# Patient Record
Sex: FEMALE | Race: WHITE | NOT HISPANIC OR LATINO | Employment: OTHER | URBAN - METROPOLITAN AREA
[De-identification: names, ages, dates, MRNs, and addresses within clinical notes are randomized per-mention and may not be internally consistent; named-entity substitution may affect disease eponyms.]

---

## 2017-08-17 NOTE — PATIENT DISCUSSION
HEMORRHAGIC POSTERIOR VITREOUS DETACHMENT, OS:  NO HOLES. NO TEARS. RETINAL  DETACHMENT WARNINGS DISCUSSED. FLOATERS AND FLASHES BROCHURE GIVEN. REFER TO RETINA FOR FURTHER EVAL/RTC ASAP IF NOTING S/S OF RD.

## 2017-08-21 NOTE — PATIENT DISCUSSION
retinal hemorrhage a choroidal lesion involving the OS. NO TEARS. HEMORRHAGE NEXT TO OPTIC NERVE. COME BACK IN A MONTH TO CHECK IT WITH OCT.

## 2017-09-25 NOTE — PATIENT DISCUSSION
POSTERIOR VITREOUS DETACHMENT OS: NO HOLES, TEARS, OR RETINAL DETACHMENTS TODAY. ADVISED PT TO CALL IF ANY FLASHES OF LIGHT, INCREASE IN FLOATERS, OR DECREASE VISION IN EITHER EYE.

## 2018-04-04 NOTE — PATIENT DISCUSSION
POSTERIOR VITREOUS DETACHMENT, OS:  NO HOLES. NO TEARS. RETINAL  DETACHMENT WARNINGS DISCUSSED. RETURN FOR FOLLOW-UP AS SCHEDULED.

## 2018-07-12 ENCOUNTER — ESTABLISHED COMPREHENSIVE EXAM (OUTPATIENT)
Dept: URBAN - METROPOLITAN AREA CLINIC 40 | Facility: CLINIC | Age: 48
End: 2018-07-12

## 2018-07-12 DIAGNOSIS — Z83.518: ICD-10-CM

## 2018-07-12 DIAGNOSIS — H52.03: ICD-10-CM

## 2018-07-12 PROCEDURE — 92499OP2 OPTOMAP RETINAL SCREENING BOTH EYES

## 2018-07-12 PROCEDURE — 92015 DETERMINE REFRACTIVE STATE: CPT

## 2018-07-12 PROCEDURE — 92014 COMPRE OPH EXAM EST PT 1/>: CPT

## 2018-07-12 ASSESSMENT — KERATOMETRY
OS_K1POWER_DIOPTERS: 44.50
OD_K2POWER_DIOPTERS: 45.25
OS_AXISANGLE2_DEGREES: 061
OD_AXISANGLE_DEGREES: 030
OD_K1POWER_DIOPTERS: 44.00
OD_AXISANGLE2_DEGREES: 120
OS_AXISANGLE_DEGREES: 151
OS_K2POWER_DIOPTERS: 46.00

## 2018-07-12 ASSESSMENT — TONOMETRY
OD_IOP_MMHG: 15
OS_IOP_MMHG: 15

## 2018-07-12 ASSESSMENT — VISUAL ACUITY
OD_CC: J1
OS_CC: J1-
OU_CC: J1
OU_CC: 20/20-1
OS_CC: 20/20-2
OD_CC: 20/20

## 2019-07-23 ENCOUNTER — ESTABLISHED COMPREHENSIVE EXAM (OUTPATIENT)
Dept: URBAN - METROPOLITAN AREA CLINIC 40 | Facility: CLINIC | Age: 49
End: 2019-07-23

## 2019-07-23 DIAGNOSIS — H52.03: ICD-10-CM

## 2019-07-23 DIAGNOSIS — Z83.518: ICD-10-CM

## 2019-07-23 DIAGNOSIS — H00.14: ICD-10-CM

## 2019-07-23 PROCEDURE — 92499OP2 OPTOMAP RETINAL SCREENING BOTH EYES

## 2019-07-23 PROCEDURE — 92014 COMPRE OPH EXAM EST PT 1/>: CPT

## 2019-07-23 PROCEDURE — 92015 DETERMINE REFRACTIVE STATE: CPT

## 2019-07-23 ASSESSMENT — KERATOMETRY
OD_AXISANGLE2_DEGREES: 115
OS_K1POWER_DIOPTERS: 44.50
OD_K2POWER_DIOPTERS: 45.25
OS_K2POWER_DIOPTERS: 46
OD_AXISANGLE2_DEGREES: 120
OS_K1POWER_DIOPTERS: 44
OS_K2POWER_DIOPTERS: 46.00
OS_AXISANGLE2_DEGREES: 061
OS_AXISANGLE_DEGREES: 137
OD_AXISANGLE_DEGREES: 25
OD_K1POWER_DIOPTERS: 44.00
OS_AXISANGLE_DEGREES: 151
OD_AXISANGLE_DEGREES: 030
OD_K1POWER_DIOPTERS: 44
OD_K2POWER_DIOPTERS: 45.5
OS_AXISANGLE2_DEGREES: 47

## 2019-07-23 ASSESSMENT — VISUAL ACUITY
OD_CC: 20/20
OS_CC: 20/20-1
OU_CC: J1
OD_CC: J1
OU_CC: 20/20
OS_CC: J2

## 2019-07-23 ASSESSMENT — TONOMETRY
OS_IOP_MMHG: 14
OD_IOP_MMHG: 14

## 2020-07-28 ENCOUNTER — ESTABLISHED COMPREHENSIVE EXAM (OUTPATIENT)
Dept: URBAN - METROPOLITAN AREA CLINIC 40 | Facility: CLINIC | Age: 50
End: 2020-07-28

## 2020-07-28 DIAGNOSIS — H02.403: ICD-10-CM

## 2020-07-28 DIAGNOSIS — H52.03: ICD-10-CM

## 2020-07-28 DIAGNOSIS — Z83.518: ICD-10-CM

## 2020-07-28 PROCEDURE — 92015 DETERMINE REFRACTIVE STATE: CPT

## 2020-07-28 PROCEDURE — 92499OP2 OPTOMAP RETINAL SCREENING BOTH EYES

## 2020-07-28 PROCEDURE — 92014 COMPRE OPH EXAM EST PT 1/>: CPT

## 2020-07-28 ASSESSMENT — KERATOMETRY
OS_AXISANGLE_DEGREES: 151
OD_AXISANGLE_DEGREES: 25
OS_K2POWER_DIOPTERS: 46.00
OD_K1POWER_DIOPTERS: 44
OD_AXISANGLE2_DEGREES: 120
OS_K2POWER_DIOPTERS: 46
OD_AXISANGLE2_DEGREES: 115
OS_AXISANGLE_DEGREES: 137
OS_AXISANGLE2_DEGREES: 061
OD_AXISANGLE_DEGREES: 030
OD_K2POWER_DIOPTERS: 45.5
OD_K1POWER_DIOPTERS: 44.00
OS_K1POWER_DIOPTERS: 44
OD_K2POWER_DIOPTERS: 45.25
OS_AXISANGLE2_DEGREES: 47
OS_K1POWER_DIOPTERS: 44.50

## 2020-07-28 ASSESSMENT — TONOMETRY
OD_IOP_MMHG: 16
OS_IOP_MMHG: 16

## 2020-07-28 ASSESSMENT — VISUAL ACUITY
OS_CC: 20/25
OD_CC: 20/25
OS_CC: J3
OU_CC: 20/20
OD_CC: J2
OU_CC: J2

## 2021-03-24 ENCOUNTER — ESTABLISHED PATIENT (OUTPATIENT)
Dept: URBAN - METROPOLITAN AREA CLINIC 40 | Facility: CLINIC | Age: 51
End: 2021-03-24

## 2021-03-24 DIAGNOSIS — H02.403: ICD-10-CM

## 2021-03-24 PROCEDURE — 92012 INTRM OPH EXAM EST PATIENT: CPT

## 2021-03-24 ASSESSMENT — KERATOMETRY
OD_AXISANGLE2_DEGREES: 120
OS_K1POWER_DIOPTERS: 44
OD_AXISANGLE_DEGREES: 25
OS_K2POWER_DIOPTERS: 46
OS_AXISANGLE_DEGREES: 151
OS_K2POWER_DIOPTERS: 46.00
OS_AXISANGLE_DEGREES: 137
OD_K2POWER_DIOPTERS: 45.5
OD_K1POWER_DIOPTERS: 44
OD_AXISANGLE2_DEGREES: 115
OD_AXISANGLE_DEGREES: 030
OS_AXISANGLE2_DEGREES: 061
OD_K1POWER_DIOPTERS: 44.00
OS_AXISANGLE2_DEGREES: 47
OS_K1POWER_DIOPTERS: 44.50
OD_K2POWER_DIOPTERS: 45.25

## 2021-03-24 ASSESSMENT — VISUAL ACUITY
OD_CC: 20/20
OS_CC: 20/20
OU_CC: J1
OU_CC: 20/15-2

## 2021-05-26 ENCOUNTER — CONSULT (OUTPATIENT)
Dept: URBAN - METROPOLITAN AREA CLINIC 39 | Facility: CLINIC | Age: 51
End: 2021-05-26

## 2021-05-26 DIAGNOSIS — H02.403: ICD-10-CM

## 2021-05-26 PROCEDURE — 92285 EXTERNAL OCULAR PHOTOGRAPHY: CPT

## 2021-05-26 PROCEDURE — 99203 OFFICE O/P NEW LOW 30 MIN: CPT

## 2021-05-26 ASSESSMENT — VISUAL ACUITY
OS_CC: 20/40-1
OD_CC: 20/40

## 2021-05-26 ASSESSMENT — KERATOMETRY
OD_K2POWER_DIOPTERS: 45.5
OD_AXISANGLE_DEGREES: 030
OS_K2POWER_DIOPTERS: 46
OS_K1POWER_DIOPTERS: 44
OS_AXISANGLE2_DEGREES: 47
OD_AXISANGLE2_DEGREES: 115
OD_AXISANGLE_DEGREES: 25
OD_K2POWER_DIOPTERS: 45.25
OD_K1POWER_DIOPTERS: 44
OS_AXISANGLE2_DEGREES: 061
OD_K1POWER_DIOPTERS: 44.00
OS_AXISANGLE_DEGREES: 151
OS_AXISANGLE_DEGREES: 137
OS_K1POWER_DIOPTERS: 44.50
OS_K2POWER_DIOPTERS: 46.00
OD_AXISANGLE2_DEGREES: 120

## 2021-06-03 ENCOUNTER — TECH ONLY (OUTPATIENT)
Dept: URBAN - METROPOLITAN AREA CLINIC 39 | Facility: CLINIC | Age: 51
End: 2021-06-03

## 2021-06-03 DIAGNOSIS — H02.403: ICD-10-CM

## 2021-06-03 PROCEDURE — 92082 INTERMEDIATE VISUAL FIELD XM: CPT

## 2021-06-03 PROCEDURE — 99211T TECH SERVICE

## 2021-06-03 ASSESSMENT — KERATOMETRY
OD_K1POWER_DIOPTERS: 44
OS_AXISANGLE_DEGREES: 151
OS_K2POWER_DIOPTERS: 46.00
OS_AXISANGLE_DEGREES: 137
OD_K2POWER_DIOPTERS: 45.25
OD_K1POWER_DIOPTERS: 44.00
OD_AXISANGLE2_DEGREES: 115
OD_K2POWER_DIOPTERS: 45.5
OS_AXISANGLE2_DEGREES: 47
OS_K1POWER_DIOPTERS: 44.50
OD_AXISANGLE_DEGREES: 030
OS_K1POWER_DIOPTERS: 44
OS_AXISANGLE2_DEGREES: 061
OD_AXISANGLE_DEGREES: 25
OD_AXISANGLE2_DEGREES: 120
OS_K2POWER_DIOPTERS: 46

## 2021-08-03 ENCOUNTER — ESTABLISHED COMPREHENSIVE EXAM (OUTPATIENT)
Dept: URBAN - METROPOLITAN AREA CLINIC 40 | Facility: CLINIC | Age: 51
End: 2021-08-03

## 2021-08-03 DIAGNOSIS — Z83.518: ICD-10-CM

## 2021-08-03 DIAGNOSIS — H02.403: ICD-10-CM

## 2021-08-03 DIAGNOSIS — H52.03: ICD-10-CM

## 2021-08-03 DIAGNOSIS — H02.831: ICD-10-CM

## 2021-08-03 DIAGNOSIS — H02.834: ICD-10-CM

## 2021-08-03 PROCEDURE — 92015 DETERMINE REFRACTIVE STATE: CPT

## 2021-08-03 PROCEDURE — 92499OP2 OPTOMAP RETINAL SCREENING BOTH EYES

## 2021-08-03 PROCEDURE — 92014 COMPRE OPH EXAM EST PT 1/>: CPT

## 2021-08-03 ASSESSMENT — KERATOMETRY
OS_K2POWER_DIOPTERS: 46.00
OD_K1POWER_DIOPTERS: 44.00
OD_AXISANGLE2_DEGREES: 120
OS_K1POWER_DIOPTERS: 44.50
OS_AXISANGLE2_DEGREES: 47
OD_K1POWER_DIOPTERS: 44
OD_AXISANGLE2_DEGREES: 115
OS_K2POWER_DIOPTERS: 46
OD_AXISANGLE_DEGREES: 030
OD_K2POWER_DIOPTERS: 45.5
OD_AXISANGLE_DEGREES: 25
OS_AXISANGLE_DEGREES: 151
OS_AXISANGLE_DEGREES: 137
OS_AXISANGLE2_DEGREES: 061
OS_K1POWER_DIOPTERS: 44
OD_K2POWER_DIOPTERS: 45.25

## 2021-08-03 ASSESSMENT — VISUAL ACUITY
OS_CC: J2
OD_CC: J1
OD_CC: 20/20-1
OU_CC: J1
OS_CC: 20/20-2
OU_CC: 20/20

## 2021-08-03 ASSESSMENT — TONOMETRY
OD_IOP_MMHG: 17
OS_IOP_MMHG: 17

## 2021-08-09 ENCOUNTER — PRE-OP/H&P (OUTPATIENT)
Dept: URBAN - METROPOLITAN AREA CLINIC 39 | Facility: CLINIC | Age: 51
End: 2021-08-09

## 2021-08-09 DIAGNOSIS — H02.403: ICD-10-CM

## 2021-08-09 PROCEDURE — 99211HP H&P OFFICE/OUTPATIENT VISIT, EST

## 2021-08-09 RX ORDER — ERYTHROMYCIN 5 MG/G
OINTMENT OPHTHALMIC
Start: 2021-08-31

## 2021-08-09 RX ORDER — TRAMADOL HCL 50 MG/1: 1 TABLET ORAL

## 2021-08-09 ASSESSMENT — KERATOMETRY
OD_AXISANGLE_DEGREES: 030
OD_AXISANGLE2_DEGREES: 120
OS_K2POWER_DIOPTERS: 46
OD_K2POWER_DIOPTERS: 45.25
OD_AXISANGLE_DEGREES: 25
OS_AXISANGLE2_DEGREES: 061
OS_K1POWER_DIOPTERS: 44
OS_AXISANGLE2_DEGREES: 47
OD_K1POWER_DIOPTERS: 44.00
OS_AXISANGLE_DEGREES: 151
OS_AXISANGLE_DEGREES: 137
OD_K1POWER_DIOPTERS: 44
OS_K2POWER_DIOPTERS: 46.00
OD_AXISANGLE2_DEGREES: 115
OS_K1POWER_DIOPTERS: 44.50
OD_K2POWER_DIOPTERS: 45.5

## 2022-06-08 ENCOUNTER — EMERGENCY VISIT (OUTPATIENT)
Dept: URBAN - METROPOLITAN AREA CLINIC 40 | Facility: CLINIC | Age: 52
End: 2022-06-08

## 2022-06-08 DIAGNOSIS — H00.15: ICD-10-CM

## 2022-06-08 PROCEDURE — 99213 OFFICE O/P EST LOW 20 MIN: CPT

## 2022-06-08 RX ORDER — DOXYCYCLINE HYCLATE 100 MG/1: 1 TABLET ORAL TWICE A DAY

## 2022-06-08 ASSESSMENT — VISUAL ACUITY
OD_CC: 20/30
OU_CC: 20/25
OS_CC: 20/25

## 2022-06-08 ASSESSMENT — KERATOMETRY
OS_AXISANGLE_DEGREES: 137
OS_AXISANGLE2_DEGREES: 061
OD_K1POWER_DIOPTERS: 44.00
OD_AXISANGLE2_DEGREES: 115
OD_AXISANGLE_DEGREES: 030
OS_K1POWER_DIOPTERS: 44.50
OD_K2POWER_DIOPTERS: 45.5
OD_AXISANGLE_DEGREES: 25
OD_K1POWER_DIOPTERS: 44
OD_K2POWER_DIOPTERS: 45.25
OS_K2POWER_DIOPTERS: 46
OS_AXISANGLE_DEGREES: 151
OS_K1POWER_DIOPTERS: 44
OD_AXISANGLE2_DEGREES: 120
OS_AXISANGLE2_DEGREES: 47
OS_K2POWER_DIOPTERS: 46.00

## 2022-06-08 ASSESSMENT — TONOMETRY
OS_IOP_MMHG: 14
OD_IOP_MMHG: 12

## 2022-08-09 ENCOUNTER — ESTABLISHED PATIENT (OUTPATIENT)
Dept: URBAN - METROPOLITAN AREA CLINIC 40 | Facility: CLINIC | Age: 52
End: 2022-08-09

## 2022-08-09 DIAGNOSIS — H01.02A: ICD-10-CM

## 2022-08-09 DIAGNOSIS — H52.03: ICD-10-CM

## 2022-08-09 DIAGNOSIS — H02.831: ICD-10-CM

## 2022-08-09 DIAGNOSIS — H01.02B: ICD-10-CM

## 2022-08-09 DIAGNOSIS — H52.203: ICD-10-CM

## 2022-08-09 PROCEDURE — 92499OP2 OPTOMAP RETINAL SCREENING BOTH EYES

## 2022-08-09 PROCEDURE — 92014 COMPRE OPH EXAM EST PT 1/>: CPT

## 2022-08-09 PROCEDURE — 92015 DETERMINE REFRACTIVE STATE: CPT

## 2022-08-09 ASSESSMENT — KERATOMETRY
OD_AXISANGLE2_DEGREES: 120
OD_K1POWER_DIOPTERS: 44
OS_AXISANGLE_DEGREES: 137
OS_K2POWER_DIOPTERS: 46.00
OS_K2POWER_DIOPTERS: 46
OS_AXISANGLE2_DEGREES: 47
OS_K1POWER_DIOPTERS: 44.50
OS_AXISANGLE_DEGREES: 151
OS_K1POWER_DIOPTERS: 44
OD_K1POWER_DIOPTERS: 44.00
OD_K2POWER_DIOPTERS: 45.5
OD_AXISANGLE_DEGREES: 25
OD_AXISANGLE2_DEGREES: 115
OD_K2POWER_DIOPTERS: 45.25
OS_AXISANGLE2_DEGREES: 061
OD_AXISANGLE_DEGREES: 030

## 2022-08-09 ASSESSMENT — VISUAL ACUITY
OD_CC: J1-1
OS_CC: J2
OD_CC: 20/25+1
OU_CC: J1+
OS_CC: 20/25-2
OU_CC: 20/25-1

## 2022-08-09 ASSESSMENT — TONOMETRY
OS_IOP_MMHG: 18
OD_IOP_MMHG: 18

## 2023-08-09 ENCOUNTER — ESTABLISHED PATIENT (OUTPATIENT)
Dept: URBAN - METROPOLITAN AREA CLINIC 40 | Facility: CLINIC | Age: 53
End: 2023-08-09

## 2023-08-09 DIAGNOSIS — H01.02A: ICD-10-CM

## 2023-08-09 DIAGNOSIS — Z83.518: ICD-10-CM

## 2023-08-09 DIAGNOSIS — H01.02B: ICD-10-CM

## 2023-08-09 DIAGNOSIS — H52.203: ICD-10-CM

## 2023-08-09 DIAGNOSIS — H04.123: ICD-10-CM

## 2023-08-09 PROCEDURE — 92250E RETINAL SCREENING, ELECTIVE

## 2023-08-09 PROCEDURE — 92015 DETERMINE REFRACTIVE STATE: CPT

## 2023-08-09 PROCEDURE — 92014 COMPRE OPH EXAM EST PT 1/>: CPT

## 2023-08-09 ASSESSMENT — KERATOMETRY
OS_K2POWER_DIOPTERS: 44.25
OS_K2POWER_DIOPTERS: 46
OS_AXISANGLE2_DEGREES: 061
OS_K1POWER_DIOPTERS: 45.75
OD_AXISANGLE2_DEGREES: 130
OD_K2POWER_DIOPTERS: 45.25
OD_K1POWER_DIOPTERS: 44
OD_AXISANGLE2_DEGREES: 115
OD_AXISANGLE_DEGREES: 25
OS_AXISANGLE_DEGREES: 137
OS_K1POWER_DIOPTERS: 44
OD_K2POWER_DIOPTERS: 45.5
OS_AXISANGLE_DEGREES: 151
OS_AXISANGLE_DEGREES: 040
OD_AXISANGLE_DEGREES: 040
OS_K1POWER_DIOPTERS: 44.50
OD_AXISANGLE_DEGREES: 030
OD_K1POWER_DIOPTERS: 43.75
OS_K2POWER_DIOPTERS: 46.00
OD_AXISANGLE2_DEGREES: 120
OD_K1POWER_DIOPTERS: 44.00
OS_AXISANGLE2_DEGREES: 130
OS_AXISANGLE2_DEGREES: 47

## 2023-08-09 ASSESSMENT — VISUAL ACUITY
OS_SC: 20/70-1
OS_CC: J2
OD_CC: J1
OD_SC: 20/60
OS_CC: 20/20-2
OD_CC: 20/25-1
OS_SC: J8
OD_SC: J12

## 2023-08-09 ASSESSMENT — TONOMETRY
OS_IOP_MMHG: 13
OD_IOP_MMHG: 16

## 2024-03-05 ENCOUNTER — EMERGENCY VISIT (OUTPATIENT)
Dept: URBAN - METROPOLITAN AREA CLINIC 40 | Facility: CLINIC | Age: 54
End: 2024-03-05

## 2024-03-05 DIAGNOSIS — H01.02B: ICD-10-CM

## 2024-03-05 DIAGNOSIS — H04.123: ICD-10-CM

## 2024-03-05 DIAGNOSIS — H01.111: ICD-10-CM

## 2024-03-05 DIAGNOSIS — H01.02A: ICD-10-CM

## 2024-03-05 PROCEDURE — 99213 OFFICE O/P EST LOW 20 MIN: CPT

## 2024-03-05 ASSESSMENT — KERATOMETRY
OD_K2POWER_DIOPTERS: 45.25
OD_K2POWER_DIOPTERS: 45.5
OS_K2POWER_DIOPTERS: 46
OS_K2POWER_DIOPTERS: 46.00
OS_AXISANGLE2_DEGREES: 47
OS_K1POWER_DIOPTERS: 45.75
OD_AXISANGLE_DEGREES: 25
OD_K1POWER_DIOPTERS: 44.00
OS_K1POWER_DIOPTERS: 44.50
OS_AXISANGLE_DEGREES: 137
OD_AXISANGLE_DEGREES: 030
OS_K2POWER_DIOPTERS: 44.25
OS_AXISANGLE_DEGREES: 040
OD_K1POWER_DIOPTERS: 44
OD_AXISANGLE2_DEGREES: 120
OD_AXISANGLE2_DEGREES: 130
OS_AXISANGLE2_DEGREES: 130
OS_AXISANGLE2_DEGREES: 061
OD_K1POWER_DIOPTERS: 43.75
OD_AXISANGLE2_DEGREES: 115
OS_K1POWER_DIOPTERS: 44
OD_AXISANGLE_DEGREES: 040
OS_AXISANGLE_DEGREES: 151

## 2024-03-05 ASSESSMENT — VISUAL ACUITY
OS_CC: 20/20
OD_CC: 20/25
OU_CC: 20/20

## 2024-03-05 ASSESSMENT — TONOMETRY
OS_IOP_MMHG: 15
OD_IOP_MMHG: 16

## 2024-08-13 ASSESSMENT — KERATOMETRY
OS_AXISANGLE_DEGREES: 137
OD_K1POWER_DIOPTERS: 44.00
OS_K2POWER_DIOPTERS: 46
OS_K1POWER_DIOPTERS: 44.50
OD_AXISANGLE_DEGREES: 25
OS_K2POWER_DIOPTERS: 44.25
OD_AXISANGLE2_DEGREES: 130
OS_AXISANGLE2_DEGREES: 47
OS_K1POWER_DIOPTERS: 44
OS_AXISANGLE2_DEGREES: 061
OD_AXISANGLE2_DEGREES: 120
OD_K2POWER_DIOPTERS: 45.25
OS_AXISANGLE_DEGREES: 040
OD_K1POWER_DIOPTERS: 44
OD_AXISANGLE_DEGREES: 040
OD_K2POWER_DIOPTERS: 45.5
OD_K1POWER_DIOPTERS: 43.75
OD_AXISANGLE_DEGREES: 030
OD_AXISANGLE2_DEGREES: 115
OS_K1POWER_DIOPTERS: 45.75
OS_AXISANGLE2_DEGREES: 130
OS_K2POWER_DIOPTERS: 46.00
OS_AXISANGLE_DEGREES: 151

## 2024-08-14 ENCOUNTER — COMPREHENSIVE EXAM (OUTPATIENT)
Dept: URBAN - METROPOLITAN AREA CLINIC 40 | Facility: CLINIC | Age: 54
End: 2024-08-14

## 2024-08-14 DIAGNOSIS — H04.123: ICD-10-CM

## 2024-08-14 DIAGNOSIS — H01.111: ICD-10-CM

## 2024-08-14 DIAGNOSIS — H01.02A: ICD-10-CM

## 2024-08-14 DIAGNOSIS — H52.203: ICD-10-CM

## 2024-08-14 DIAGNOSIS — H01.02B: ICD-10-CM

## 2024-08-14 PROCEDURE — 92250E RETINAL SCREENING, ELECTIVE

## 2024-08-14 PROCEDURE — 92014 COMPRE OPH EXAM EST PT 1/>: CPT

## 2024-08-14 PROCEDURE — 92015 DETERMINE REFRACTIVE STATE: CPT

## 2024-08-14 ASSESSMENT — VISUAL ACUITY
OS_CC: J3
OS_PH: 20/20
OD_PH: 20/20
OS_SC: 20/60
OS_SC: J12
OD_CC: 20/25
OD_SC: 20/60
OU_SC: J8
OU_SC: 20/50
OU_CC: J1
OS_CC: 20/20
OD_SC: J8
OU_CC: 20/20
OD_CC: J3

## 2024-08-14 ASSESSMENT — TONOMETRY
OS_IOP_MMHG: 15
OD_IOP_MMHG: 16

## 2025-08-20 ENCOUNTER — COMPREHENSIVE EXAM (OUTPATIENT)
Age: 55
End: 2025-08-20

## 2025-08-20 DIAGNOSIS — H01.02A: ICD-10-CM

## 2025-08-20 DIAGNOSIS — H01.111: ICD-10-CM

## 2025-08-20 DIAGNOSIS — H01.02B: ICD-10-CM

## 2025-08-20 DIAGNOSIS — H04.123: ICD-10-CM

## 2025-08-20 DIAGNOSIS — H52.203: ICD-10-CM

## 2025-08-20 PROCEDURE — 92014 COMPRE OPH EXAM EST PT 1/>: CPT

## 2025-08-20 PROCEDURE — 92015 DETERMINE REFRACTIVE STATE: CPT

## 2025-08-20 PROCEDURE — 92250E RETINAL SCREENING, ELECTIVE
